# Patient Record
Sex: FEMALE | Race: BLACK OR AFRICAN AMERICAN | URBAN - METROPOLITAN AREA
[De-identification: names, ages, dates, MRNs, and addresses within clinical notes are randomized per-mention and may not be internally consistent; named-entity substitution may affect disease eponyms.]

---

## 2021-10-08 ENCOUNTER — OFFICE VISIT (OUTPATIENT)
Dept: URBAN - METROPOLITAN AREA CLINIC 21 | Facility: CLINIC | Age: 31
End: 2021-10-08
Payer: COMMERCIAL

## 2021-10-08 PROCEDURE — 92002 INTRM OPH EXAM NEW PATIENT: CPT | Performed by: OPHTHALMOLOGY

## 2021-10-08 RX ORDER — BESIFLOXACIN 6 MG/ML
0.6 % SUSPENSION OPHTHALMIC
Qty: 5 | Refills: 0 | Status: INACTIVE
Start: 2021-10-08 | End: 2021-10-11

## 2021-10-08 NOTE — IMPRESSION/PLAN
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 Plan: Pt to use Besivance Q1H OS, RTC 3 days, Stop wearing cls and wear glasses, and gave patient RX Ofloxacin.

## 2021-10-13 ENCOUNTER — OFFICE VISIT (OUTPATIENT)
Dept: URBAN - METROPOLITAN AREA CLINIC 21 | Facility: CLINIC | Age: 31
End: 2021-10-13
Payer: COMMERCIAL

## 2021-10-13 DIAGNOSIS — H16.042 MARGINAL CORNEAL ULCER, LEFT EYE: Primary | ICD-10-CM

## 2021-10-13 PROCEDURE — 99213 OFFICE O/P EST LOW 20 MIN: CPT | Performed by: OPTOMETRIST

## 2021-10-13 NOTE — IMPRESSION/PLAN
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 Plan: Condition improving. Decrease Besivance to 6 x daily, OS. Caution PT not to rub eye. Do not wear contacts while sleeping.

## 2021-10-18 ENCOUNTER — OFFICE VISIT (OUTPATIENT)
Dept: URBAN - METROPOLITAN AREA CLINIC 21 | Facility: CLINIC | Age: 31
End: 2021-10-18
Payer: COMMERCIAL

## 2021-10-18 PROCEDURE — 99212 OFFICE O/P EST SF 10 MIN: CPT | Performed by: OPHTHALMOLOGY

## 2021-10-18 NOTE — IMPRESSION/PLAN
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 Plan: Infiltrate less but still present.   Continue Besivance QID

RTC 1 week

## 2021-10-25 ENCOUNTER — OFFICE VISIT (OUTPATIENT)
Dept: URBAN - METROPOLITAN AREA CLINIC 21 | Facility: CLINIC | Age: 31
End: 2021-10-25

## 2021-10-25 PROCEDURE — 99212 OFFICE O/P EST SF 10 MIN: CPT | Performed by: OPHTHALMOLOGY

## 2021-10-25 NOTE — IMPRESSION/PLAN
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 Plan: Discussed diagnosis in detail with patient. Will continue to observe condition and or symptoms. Cont taking Besivance TID OS.  

RTC PRN